# Patient Record
Sex: MALE | Race: BLACK OR AFRICAN AMERICAN | NOT HISPANIC OR LATINO | Employment: UNEMPLOYED | URBAN - METROPOLITAN AREA
[De-identification: names, ages, dates, MRNs, and addresses within clinical notes are randomized per-mention and may not be internally consistent; named-entity substitution may affect disease eponyms.]

---

## 2019-02-27 ENCOUNTER — HOSPITAL ENCOUNTER (EMERGENCY)
Facility: MEDICAL CENTER | Age: 39
End: 2019-02-27
Attending: EMERGENCY MEDICINE

## 2019-02-27 VITALS
OXYGEN SATURATION: 99 % | RESPIRATION RATE: 15 BRPM | BODY MASS INDEX: 24.05 KG/M2 | HEART RATE: 89 BPM | HEIGHT: 74 IN | SYSTOLIC BLOOD PRESSURE: 98 MMHG | WEIGHT: 187.39 LBS | DIASTOLIC BLOOD PRESSURE: 70 MMHG | TEMPERATURE: 98.2 F

## 2019-02-27 DIAGNOSIS — R41.82 ALTERED MENTAL STATUS, UNSPECIFIED ALTERED MENTAL STATUS TYPE: ICD-10-CM

## 2019-02-27 DIAGNOSIS — R46.89 AGGRESSIVE BEHAVIOR: ICD-10-CM

## 2019-02-27 DIAGNOSIS — F15.10 METHAMPHETAMINE ABUSE (HCC): ICD-10-CM

## 2019-02-27 LAB — POC BREATHALIZER: 0 PERCENT (ref 0–0.01)

## 2019-02-27 PROCEDURE — 302970 POC BREATHALIZER

## 2019-02-27 PROCEDURE — 304561 HCHG STAT O2

## 2019-02-27 PROCEDURE — 90791 PSYCH DIAGNOSTIC EVALUATION: CPT

## 2019-02-27 PROCEDURE — 99284 EMERGENCY DEPT VISIT MOD MDM: CPT

## 2019-02-27 ASSESSMENT — LIFESTYLE VARIABLES: DO YOU DRINK ALCOHOL: NO

## 2019-02-27 NOTE — ED NOTES
"Received orders for DC. 3 bags of belongings returned to pt and encouraged to get dressed. Stating he dose not want to leave. Security to bedside. Encouraged to go to [facility] however states he doesn't want to and \"I want to take a nap here.\"    VSS. PIV removed and dressing applied. Educated regarding establishing care with [facility]. Able to dress self and ambulates in room with steady gate.  "

## 2019-02-27 NOTE — DISCHARGE PLANNING
"[facility]   TRIAGE ASSESSMENT    Name: Miles Brush  MRN: [id number]  : 1980  Age: 38 y.o.  Date of assessment: 2019  PCP: Pcp Pt States None  Persons in attendance: Patient    CHIEF COMPLAINT/PRESENTING ISSUE (as stated by patient): 38 year old male BIB EMS, legal hold, inability to care for self, patient [place]assed from [place], when police arrived patient became aggressive, was endorsing SI. Was given 300mg IM Ketamine and 4mg Versed; pt cont aggressive in ED, placed in restraints; today, pt drowsy, \"can I sleep\"; oriented to self, place, events; calm behaviors; cooperative; audibly grinding his teeth  No delusions, paranoia, or internal preoccupation present; currently denies SI, HI, self-harm ideation; denies h/o SA; denies current outpt MH providers, primary care provider, or psych meds; denies h/o inpt MH/CD treatment; denies h/o aggression or arrests; current substance use includes methamphetamines (smoking $10 daily, last use 19), THC (2 joints/month, last use 2019); denies attending 12 step or recovery mtgs; states he would like help with CD resources, sobriety, but does not want a residential program; pt homeless, in [place] x 2 weeks from [place], [place] where he was living for a year; states he came to [place] b/c he thought he would find better resources but states he plans to return to  in March after he gets his disability payment the  of the month( $1300/month for back injury); states bus ticket is $145; also states meth is less expensive in [place] ($10) vs [place] ($17); states positive support system is \"God\" and the [facility]    Chief Complaint   Patient presents with   • Aggressive Behavior        CURRENT LIVING SITUATION/SOCIAL SUPPORT: homeless, in [place] x 2 weeks from [place], [place] where he was living for a year; plans to return to  in March after he gets his disability payment (#10417/month); states positive support system is \"God\" and the " [facility]      BEHAVIORAL HEALTH TREATMENT HISTORY  Does patient/parent report a history of prior behavioral health treatment for patient?   No:    SAFETY ASSESSMENT - SELF  Does patient acknowledge current or past symptoms of dangerousness to self? no  Does parent/significant other report patient has current or past symptoms of dangerousness to self? N\A  Does presenting problem suggest symptoms of dangerousness to self? Yes-pt verbalized SI this AM, no plan   Past Current    Suicidal Thoughts: []  [x]    Suicidal Plans: []  []    Suicidal Intent: []  []    Suicide Attempts: []  []    Self-Injury []  []      For any boxes checked above, provide detail: pt verbalized SI this AM, no plan    History of suicide by family member: no  History of suicide by friend/significant other: no  Recent change in frequency/specificity/intensity of suicidal thoughts or self-harm behavior? no  Current access to firearms, medications, or other identified means of suicide/self-harm? no  If yes, willing to restrict access to means of suicide/self-harm? yes - no means noted  Protective factors present:  Future-oriented, Hopefulness, Spiritual beliefs/practices and Willing to address in treatment    SAFETY ASSESSMENT - OTHERS  Does patient acknowledge current or past symptoms of aggressive behavior or risk to others?  No  Does parent/significant other report patient has current or past symptoms of aggressive behavior or risk to others?  N\A  Does presenting problem suggest symptoms of dangerousness to others? Yes:-became aggressive, was given 300mg IM Ketamine and 4mg Versed; pt cont aggressive in ED, placed in restraints   History Current   Thoughts of injuring others? []  [x]    Threats to injure others? []  []    Plan to injure others? []  []    Intent to injure others? []  [x]    Has injured others? []  []    Thoughts of killing others? []  []    Threats to kill others? []  []    Plans to kill others? []  []    Intent to kill others? []   []    Has killed others? []  []    Perpetrator of sexual assault? []  []    Family history of homicide? []  []      For any boxes checked above, please provide detail: pt became aggressive, was given 300mg IM Ketamine and 4mg Versed; pt cont aggressive in ED, placed in restraints    Recent change in frequency/specificity/intensity of thoughts or threats to harm others? yes - today aggressive when police called  Current access to firearms/other identified means of harm? no  If yes, willing to restrict access to weapons/means of harm? yes - no weapons or guns  Protective factors present: Fear of consequences, Moral/spiritual prohibition and Willing to address in treatment  Based on information provided during the current assessment, is a mandated “duty to warn” being exercised? No    Crisis Safety Plan completed and copy given to patient? No-pt refused    ABUSE/NEGLECT SCREENING  Does patient report feeling “unsafe” in his/her home, or afraid of anyone?  no  Does patient report any history of physical, sexual, or emotional abuse?  no  Does parent or significant other report any of the above? N\A  Is there evidence of neglect by self?  no  Is there evidence of neglect by a caregiver? no  Does the patient/parent report any history of CPS/APS/police involvement related to suspected abuse/neglect or domestic violence? no  Based on the information provided during the current assessment, is a mandated report of suspected abuse/neglect being made?  No    SUBSTANCE USE SCREENING  Yes:  José Miguel all substances used in the past 30 days:      Last Use Amount   []   Alcohol     [x]   Marijuana 1/2019 2 joints/month   []   Heroin     []   Prescription Opioids  (used without prescription, for    recreation, or in excess of prescribed amount)     []   Other Prescription  (used without prescription, for    recreation, or in excess of prescribed amount)     []   Cocaine      [x]   Methamphetamine 2/26/19 $10/day, daily use   []    "\"\" drugs (ectasy, MDMA)     []   Other substances        UDS results: pending collection  Breathalyzer results: negative     What consequences does the patient associate with any of the above substance use and or addictive behaviors? None    Risk factors for detox (check all that apply):  []  Seizures   []  Diaphoretic (sweating)   []  Tremors   []  Hallucinations   []  Increased blood pressure   []  Decreased blood pressure   []  Other   []  None      [] Patient education on risk factors for detoxification and instructed to return to ER as needed.      MENTAL STATUS   Participation: Limited verbal participation and drowsy  Grooming: Casual and Neat  Orientation: Drowsy/Somnolent and Disoriented to: date  Behavior: Calm  Eye contact: Limited  Mood: Irritable  Affect: Blunted  Thought process: Logical and Goal-directed  Thought content: Within normal limits  Speech: Rate within normal limits and Volume within normal limits  Perception: Within normal limits  Memory:  No gross evidence of memory deficits  Insight: Adequate  Judgment:  Adequate  Other:    Collateral information:   Source:  [] Significant other present in person:   [] Significant other by telephone  [] [facility]   [x] [facility] Nursing Staff  [x] [facility]  [] Other:     [] Unable to complete full assessment due to:  [] Acute intoxication  [] Patient declined to participate/engage  [] Patient verbally unresponsive  [] Significant cognitive deficits  [] Significant perceptual distortions or behavioral disorganization  [] Other:      CLINICAL IMPRESSIONS:  Primary:  Depressed mood secondary to methamphetamine use  Secondary:  methamphetamine use disorder      IDENTIFIED NEEDS/PLAN:  [Trigger DISPOSITION list for any items marked]    []  Imminent safety risk - self [] Imminent safety risk - others   []  Acute substance withdrawal []  Psychosis/Impaired reality testing   [x]  Mood/anxiety [x]  Substance use/Addictive behavior   [x] "  Maladaptive behaviro []  Parent/child conflict   []  Family/Couples conflict []  Biomedical   [x]  Housing []  Financial   []   Legal  Occupational/Educational   []  Domestic violence []  Other:     Disposition: Refer to [facility], 12 Step program: [facility] and [facility]    Does patient express agreement with the above plan? yes    Referral appointment(s) scheduled? no    Alert team only:   I have discussed findings and recommendations with Dr. Key who is in agreement with these recommendations and will DC the legal hold    Referral information sent to the following community providers :GABRIEL    If applicable : Referred  to :  for legal hold follow up at (time): GABRIEL Salinas R.N.  2/27/2019

## 2019-02-27 NOTE — ED PROVIDER NOTES
"ED Provider Note    Scribed for Dave Key M.D. by Naman Reza. 2/27/2019  4:25 AM    Primary care provider: Pcp Pt States None  Means of arrival: Ambulance  History obtained from: Nurse's note  History limited by: Patient's unresponsiveness     CHIEF COMPLAINT  Chief Complaint   Patient presents with   • Aggressive Behavior       HPI  Miles Brush is a 38 y.o. male who presents to the Emergency Department via ambulance for aggressive behavior   Per nurse's note the patient arrived with [employer] who reports the patient became aggressive and claimed SI. Patient was brought into the ED and given 300 mg IM ketamine and 4 mg versed. Upon ERP evaluation the patient does not answer questions and holds eyelids shut.    HPI limited secondary to patient's unresponsiveness.     REVIEW OF SYSTEMS  See HPI for further details.   ROS limited secondary to patient's unresponsiveness.     PAST MEDICAL HISTORY   has a past medical history of Bipolar 1 disorder (CMS-HCC) and Stroke (CMS-HCC).    SURGICAL HISTORY  patient denies any surgical history    SOCIAL HISTORY  Social History   Substance Use Topics   • Smoking status: Current Every Day Smoker   • Alcohol use Yes      History   Drug Use     Comment: ice       FAMILY HISTORY  None noted     CURRENT MEDICATIONS  No current facility-administered medications on file prior to encounter.      No current outpatient prescriptions on file prior to encounter.      ALLERGIES  No Known Allergies    PHYSICAL EXAM  VITAL SIGNS: /85   Pulse (!) 105   Temp 36.7 °C (98 °F) (Temporal)   Resp 16   Ht 1.88 m (6' 2\")   Wt 85 kg (187 lb 6.3 oz)   SpO2 97%   BMI 24.06 kg/m²     Nursing note and vitals reviewed.  Constitutional: Well-developed and well-nourished. No distress.   HENT: Head is normocephalic and atraumatic. Oropharynx is clear and moist without exudate or erythema.   Eyes: Pupils are equal, round, and reactive to light. Conjunctiva are normal.   Cardiovascular: Normal " rate and regular rhythm. No murmur heard. Normal radial pulses.  Pulmonary/Chest: Breath sounds normal. No wheezes or rales.   Abdominal: Soft. No distention    Musculoskeletal: Extremities exhibit normal range of motion without edema or tenderness.   Neurological: Unresponsive. No focal deficits noted.  Skin: Skin is warm and dry. No rash.   Psychiatric: Unable to assess.      DIAGNOSTIC STUDIES / PROCEDURES    LABS  Results for orders placed or performed during the hospital encounter of 02/27/19   POC BREATHALIZER   Result Value Ref Range    POC Breathalizer 0.002 0.00 - 0.01 Percent      All labs reviewed by me.    COURSE & MEDICAL DECISION MAKING  Nursing notes, VS, PMSFHx reviewed in chart.     Review of past medical records shows the patient was last here in 2016 for SI.      4:25 AM - Patient seen and examined at bedside. Patient is nonresponsive, however holds eyes closed.     7:34 AM On repeat evaluation, the patient is alert and awake and smiling. He denies SI or HI.     8:52 AM Reviewed the patient's UMBERTO at 0.002.     10:38 AM I discussed the patient's case and the above findings with [facility] who states the patient has been using methamphetamines. Patient is not suicidal and continues to rest comfortably in room. [facility] is comfortable with patient discharge.     HTN/IDDM FOLLOW UP:  The patient is referred to a primary physician for blood pressure management, diabetic screening, and for all other preventive health concerns    The patient will return for new or worsening symptoms and is stable at the time of discharge.    The patient is referred to a primary physician for blood pressure management, diabetic screening, and for all other preventative health concerns.    DISPOSITION:  Patient will be discharged home in stable condition.    FOLLOW UP:  [facility], Emergency Dept  [address] [place] [address] [id number] [phone]  Schedule an appointment as soon as possible for  a visit         FINAL IMPRESSION  1. Altered mental status, unspecified altered mental status type    2. Aggressive behavior    3. Methamphetamine abuse (HCC)          INaman (Scribe), am scribing for, and in the presence of, Dave Key M.D..    Electronically signed by: Naman Reza ([place]), 2/27/2019    IDave M.D. personally performed the services described in this documentation, as scribed by Naman Reza in my presence, and it is both accurate and complete. E.     The note accurately reflects work and decisions made by me.  Dave Key  2/27/2019  11:50 AM

## 2019-02-27 NOTE — ED TRIAGE NOTES
Patient arrives via EMS and police from [facility]. Per report, patient was trespassed from [facility], when police arrived patient became aggressive, was endorsing SI. Was given 300mg IM Ketamine and 4mg Versed. Arrives somnolent, remains on 3L NC with SpO2 98%. Opens eyes to tactile stimuli. Changed into hospital gown, belongings placed in bags.

## 2019-02-27 NOTE — ED NOTES
Rounded on pt. Suicide screening completed. States he wishes to be dead and has suicidal ideation, however has no hx of attempt and no plan.

## 2019-02-27 NOTE — ED NOTES
Received report and assumed care of pt. 1:1 sitter placed outside of room. Per report [place] Scale has not been completed prior do to pt not being cooperative. Security at bedside to search 3 bags of belongings. Face sheets printed out for each bag.

## 2019-02-27 NOTE — ED NOTES
Pt arrived on legal hold initiated by connie. Unable to obtain [place] suicide scale when pt arrived d/t pt unable to answer questions from noc.  Will continue to monitor. Pt maintaining 100% on ra. Pt calm at RN. erp at bedside for re-evaluation and pt clear to go to [facility].

## 2019-02-27 NOTE — ED NOTES
"Received report from Eva ALEXIS assumed care done.   Pt now awake but still somnolent started screaming \"HELLO\" for few seconds then fell back to sleep.  Will continue to monitor.  "

## 2019-02-27 NOTE — ED NOTES
Pt moved to G29. Bedside report to Susannah. Security called for belongings search. Soft wrist restraints removed. Pt now calm at staff.  Denies suicidal ideation at RN. Pt more awake, respiration unlabored.

## 2020-05-22 ENCOUNTER — HOSPITAL ENCOUNTER (EMERGENCY)
Facility: MEDICAL CENTER | Age: 40
End: 2020-05-23
Attending: EMERGENCY MEDICINE

## 2020-05-22 DIAGNOSIS — T14.8XXA ABRASION: ICD-10-CM

## 2020-05-22 DIAGNOSIS — F15.10 METHAMPHETAMINE ABUSE (HCC): ICD-10-CM

## 2020-05-22 LAB
ALBUMIN SERPL BCP-MCNC: 4.3 G/DL (ref 3.2–4.9)
ALBUMIN/GLOB SERPL: 1.2 G/DL
ALP SERPL-CCNC: 75 U/L (ref 30–99)
ALT SERPL-CCNC: 44 U/L (ref 2–50)
ANION GAP SERPL CALC-SCNC: 12 MMOL/L (ref 7–16)
AST SERPL-CCNC: 61 U/L (ref 12–45)
BASOPHILS # BLD AUTO: 0.6 % (ref 0–1.8)
BASOPHILS # BLD: 0.04 K/UL (ref 0–0.12)
BILIRUB SERPL-MCNC: 2.5 MG/DL (ref 0.1–1.5)
BUN SERPL-MCNC: 13 MG/DL (ref 8–22)
CALCIUM SERPL-MCNC: 8.9 MG/DL (ref 8.5–10.5)
CHLORIDE SERPL-SCNC: 106 MMOL/L (ref 96–112)
CO2 SERPL-SCNC: 19 MMOL/L (ref 20–33)
CREAT SERPL-MCNC: 1.19 MG/DL (ref 0.5–1.4)
EKG IMPRESSION: NORMAL
EOSINOPHIL # BLD AUTO: 0.18 K/UL (ref 0–0.51)
EOSINOPHIL NFR BLD: 2.8 % (ref 0–6.9)
ERYTHROCYTE [DISTWIDTH] IN BLOOD BY AUTOMATED COUNT: 43.5 FL (ref 35.9–50)
GLOBULIN SER CALC-MCNC: 3.7 G/DL (ref 1.9–3.5)
GLUCOSE SERPL-MCNC: 209 MG/DL (ref 65–99)
HCT VFR BLD AUTO: 41.4 % (ref 42–52)
HGB BLD-MCNC: 14.1 G/DL (ref 14–18)
IMM GRANULOCYTES # BLD AUTO: 0.03 K/UL (ref 0–0.11)
IMM GRANULOCYTES NFR BLD AUTO: 0.5 % (ref 0–0.9)
LYMPHOCYTES # BLD AUTO: 1.93 K/UL (ref 1–4.8)
LYMPHOCYTES NFR BLD: 30.1 % (ref 22–41)
MCH RBC QN AUTO: 33.6 PG (ref 27–33)
MCHC RBC AUTO-ENTMCNC: 34.1 G/DL (ref 33.7–35.3)
MCV RBC AUTO: 98.6 FL (ref 81.4–97.8)
MONOCYTES # BLD AUTO: 0.89 K/UL (ref 0–0.85)
MONOCYTES NFR BLD AUTO: 13.9 % (ref 0–13.4)
NEUTROPHILS # BLD AUTO: 3.35 K/UL (ref 1.82–7.42)
NEUTROPHILS NFR BLD: 52.1 % (ref 44–72)
NRBC # BLD AUTO: 0 K/UL
NRBC BLD-RTO: 0 /100 WBC
PLATELET # BLD AUTO: 244 K/UL (ref 164–446)
PMV BLD AUTO: 8.8 FL (ref 9–12.9)
POTASSIUM SERPL-SCNC: 3.3 MMOL/L (ref 3.6–5.5)
PROT SERPL-MCNC: 8 G/DL (ref 6–8.2)
RBC # BLD AUTO: 4.2 M/UL (ref 4.7–6.1)
SODIUM SERPL-SCNC: 137 MMOL/L (ref 135–145)
TROPONIN T SERPL-MCNC: 7 NG/L (ref 6–19)
WBC # BLD AUTO: 6.4 K/UL (ref 4.8–10.8)

## 2020-05-22 PROCEDURE — 99284 EMERGENCY DEPT VISIT MOD MDM: CPT

## 2020-05-22 PROCEDURE — 700105 HCHG RX REV CODE 258: Performed by: EMERGENCY MEDICINE

## 2020-05-22 PROCEDURE — 96374 THER/PROPH/DIAG INJ IV PUSH: CPT

## 2020-05-22 PROCEDURE — 93005 ELECTROCARDIOGRAM TRACING: CPT | Performed by: EMERGENCY MEDICINE

## 2020-05-22 PROCEDURE — 84484 ASSAY OF TROPONIN QUANT: CPT

## 2020-05-22 PROCEDURE — 80053 COMPREHEN METABOLIC PANEL: CPT

## 2020-05-22 PROCEDURE — 85025 COMPLETE CBC W/AUTO DIFF WBC: CPT

## 2020-05-22 PROCEDURE — 96375 TX/PRO/DX INJ NEW DRUG ADDON: CPT

## 2020-05-22 PROCEDURE — 700111 HCHG RX REV CODE 636 W/ 250 OVERRIDE (IP): Performed by: EMERGENCY MEDICINE

## 2020-05-22 RX ORDER — KETOROLAC TROMETHAMINE 30 MG/ML
30 INJECTION, SOLUTION INTRAMUSCULAR; INTRAVENOUS ONCE
Status: COMPLETED | OUTPATIENT
Start: 2020-05-22 | End: 2020-05-22

## 2020-05-22 RX ORDER — SODIUM CHLORIDE 9 MG/ML
1000 INJECTION, SOLUTION INTRAVENOUS ONCE
Status: COMPLETED | OUTPATIENT
Start: 2020-05-22 | End: 2020-05-22

## 2020-05-22 RX ORDER — LORAZEPAM 2 MG/ML
1 INJECTION INTRAMUSCULAR ONCE
Status: COMPLETED | OUTPATIENT
Start: 2020-05-22 | End: 2020-05-22

## 2020-05-22 RX ADMIN — SODIUM CHLORIDE 1000 ML: 9 INJECTION, SOLUTION INTRAVENOUS at 18:27

## 2020-05-22 RX ADMIN — LORAZEPAM 1 MG: 2 INJECTION INTRAMUSCULAR; INTRAVENOUS at 18:31

## 2020-05-22 RX ADMIN — KETOROLAC TROMETHAMINE 30 MG: 30 INJECTION, SOLUTION INTRAMUSCULAR at 18:31

## 2020-05-23 VITALS
BODY MASS INDEX: 30.22 KG/M2 | RESPIRATION RATE: 16 BRPM | HEIGHT: 71 IN | TEMPERATURE: 97.9 F | HEART RATE: 87 BPM | DIASTOLIC BLOOD PRESSURE: 79 MMHG | OXYGEN SATURATION: 97 % | SYSTOLIC BLOOD PRESSURE: 131 MMHG | WEIGHT: 215.83 LBS

## 2020-05-23 NOTE — ED TRIAGE NOTES
Patient was doing drugs and drinking yesterday when he passed out with shorts on yesterday face down and burned the back of his legs with the sun.  He is not a very good historian.  Asking for morphine for his sunburn in triage.  Explained a doctor would need to evaluate him.  Asked patient to show burn in triage, no major redness, no swelling noted.  Patient asking me to keep looking.  Explained doctor would evaluate.

## 2020-05-23 NOTE — ED PROVIDER NOTES
CHIEF COMPLAINT  Chief Complaint   Patient presents with   • Wound Check       HPI  Miles Brush is a 39 y.o. male who presents for what seems to be an abrasion to 1 of his thighs posteriorly.  He states initially to triage that he burned his legs after he was doing drugs and drinking yesterday and passed out in the sun.  He is a poor historian.  He has a history of bipolar and methamphetamine abuse.  He was asking for morphine in triage.  When I asked him about the lesions he states he is not really sure how they got there but maybe his shorts scraped his legs.  He denies fever.  No vomiting.  No shortness of breath or chest pain.    REVIEW OF SYSTEMS  All other systems are negative.     PAST MEDICAL HISTORY  Past Medical History:   Diagnosis Date   • Bipolar 1 disorder (HCC)    • Stroke (HCC)        FAMILY HISTORY  No family history on file.    SOCIAL HISTORY  Social History     Socioeconomic History   • Marital status: Single     Spouse name: Not on file   • Number of children: Not on file   • Years of education: Not on file   • Highest education level: Not on file   Occupational History   • Not on file   Social Needs   • Financial resource strain: Not on file   • Food insecurity     Worry: Not on file     Inability: Not on file   • Transportation needs     Medical: Not on file     Non-medical: Not on file   Tobacco Use   • Smoking status: Current Every Day Smoker   Substance and Sexual Activity   • Alcohol use: Yes   • Drug use: Yes     Comment: ice   • Sexual activity: Not on file   Lifestyle   • Physical activity     Days per week: Not on file     Minutes per session: Not on file   • Stress: Not on file   Relationships   • Social connections     Talks on phone: Not on file     Gets together: Not on file     Attends Cheondoism service: Not on file     Active member of club or organization: Not on file     Attends meetings of clubs or organizations: Not on file     Relationship status: Not on file   • Intimate  "partner violence     Fear of current or ex partner: Not on file     Emotionally abused: Not on file     Physically abused: Not on file     Forced sexual activity: Not on file   Other Topics Concern   • Not on file   Social History Narrative   • Not on file       SURGICAL HISTORY  History reviewed. No pertinent surgical history.    CURRENT MEDICATIONS  Home Medications     Reviewed by Pia Casey R.N. (Registered Nurse) on 05/22/20 at 1755  Med List Status: Not Addressed   Medication Last Dose Status        Patient Carlos Taking any Medications                       ALLERGIES  No Known Allergies    PHYSICAL EXAM  VITAL SIGNS: /86   Pulse (!) 139   Temp 36.6 °C (97.8 °F) (Temporal)   Resp 18   Ht 1.803 m (5' 11\")   Wt 97.9 kg (215 lb 13.3 oz)   SpO2 98%   BMI 30.10 kg/m²      Constitutional: Well developed, Well nourished, moderate distress, Non-toxic appearance.   HENT: Normocephalic, Atraumatic, TMs normal, mucous membranes moist, no erythema, exudates, swelling, or masses, nares patent  Eyes: nonicteric  Neck: Supple, no meningismus  Lymphatic: No lymphadenopathy noted.   Cardiovascular: Tachycardic with regular rhythm, no gallops rubs or murmurs  Lungs: Clear bilaterally   Abdomen: Bowel sounds normal, Soft, No tenderness, No pulsatile masses.   Skin: Warm, Dry, no rash  Back: No tenderness, No CVA tenderness.   Genitalia: Deferred  Rectal: Deferred  Extremities: There appears to be a minor abrasion to the inner left thigh region with no surrounding erythema or discharge  Neurologic: Alert, the patient is moving around in his bed and is grinding his teeth.  He is moving all extremities equally.  Speech is not slurred but it is pressured.  Psychiatric: Affect normal    EKG  Time is 642, rate of 109, sinus tachycardia, early precordial transition, prolonged QTC, no ST segment elevation or depression, no T wave changes    RADIOLOGY/PROCEDURES  Results for orders placed or performed during the hospital " encounter of 05/22/20   CBC WITH DIFFERENTIAL   Result Value Ref Range    WBC 6.4 4.8 - 10.8 K/uL    RBC 4.20 (L) 4.70 - 6.10 M/uL    Hemoglobin 14.1 14.0 - 18.0 g/dL    Hematocrit 41.4 (L) 42.0 - 52.0 %    MCV 98.6 (H) 81.4 - 97.8 fL    MCH 33.6 (H) 27.0 - 33.0 pg    MCHC 34.1 33.7 - 35.3 g/dL    RDW 43.5 35.9 - 50.0 fL    Platelet Count 244 164 - 446 K/uL    MPV 8.8 (L) 9.0 - 12.9 fL    Neutrophils-Polys 52.10 44.00 - 72.00 %    Lymphocytes 30.10 22.00 - 41.00 %    Monocytes 13.90 (H) 0.00 - 13.40 %    Eosinophils 2.80 0.00 - 6.90 %    Basophils 0.60 0.00 - 1.80 %    Immature Granulocytes 0.50 0.00 - 0.90 %    Nucleated RBC 0.00 /100 WBC    Neutrophils (Absolute) 3.35 1.82 - 7.42 K/uL    Lymphs (Absolute) 1.93 1.00 - 4.80 K/uL    Monos (Absolute) 0.89 (H) 0.00 - 0.85 K/uL    Eos (Absolute) 0.18 0.00 - 0.51 K/uL    Baso (Absolute) 0.04 0.00 - 0.12 K/uL    Immature Granulocytes (abs) 0.03 0.00 - 0.11 K/uL    NRBC (Absolute) 0.00 K/uL   COMP METABOLIC PANEL   Result Value Ref Range    Sodium 137 135 - 145 mmol/L    Potassium 3.3 (L) 3.6 - 5.5 mmol/L    Chloride 106 96 - 112 mmol/L    Co2 19 (L) 20 - 33 mmol/L    Anion Gap 12.0 7.0 - 16.0    Glucose 209 (H) 65 - 99 mg/dL    Bun 13 8 - 22 mg/dL    Creatinine 1.19 0.50 - 1.40 mg/dL    Calcium 8.9 8.5 - 10.5 mg/dL    AST(SGOT) 61 (H) 12 - 45 U/L    ALT(SGPT) 44 2 - 50 U/L    Alkaline Phosphatase 75 30 - 99 U/L    Total Bilirubin 2.5 (H) 0.1 - 1.5 mg/dL    Albumin 4.3 3.2 - 4.9 g/dL    Total Protein 8.0 6.0 - 8.2 g/dL    Globulin 3.7 (H) 1.9 - 3.5 g/dL    A-G Ratio 1.2 g/dL   TROPONIN   Result Value Ref Range    Troponin T 7 6 - 19 ng/L   ESTIMATED GFR   Result Value Ref Range    GFR If African American >60 >60 mL/min/1.73 m 2    GFR If Non African American >60 >60 mL/min/1.73 m 2   EKG (NOW)   Result Value Ref Range    Report       [facility] Emergency Dept.    Test Date:  2020-05-22  Pt Name:    JANE CHANG                 Department: ER  MRN:         [id number]                      Room:       [facility]  Gender:     Male                         Technician: [id number]  :        1980                   Requested By:BERTO SUTTON  Order #:    [id number]                    Reading MD:    Measurements  Intervals                                Axis  Rate:       109                          P:          69  AZ:         124                          QRS:        42  QRSD:       78                           T:          67  QT:         360  QTc:        485    Interpretive Statements  SINUS TACHYCARDIA  PROBABLE LEFT ATRIAL ABNORMALITY  EARLY PRECORDIAL R/S TRANSITION  BORDERLINE PROLONGED QT INTERVAL  Compared to ECG 2015 07:27:47  Incomplete right bundle-branch block no longer present  Myocardial infarct finding no longer present  ST (T wave) deviation no longer present       COURSE & MEDICAL DECISION MAKING  Pertinent Labs & Imaging studies reviewed. (See chart for details)  This is a 39-year-old who has a history of bipolar as well as methamphetamine and alcohol abuse.  He presents complaining of what appears to be a minor abrasion to his proximal thigh on the left.  There is no evidence of sunburn.  The patient was tachycardic up to the 140 range on arrival.  He was given fluids and Ativan and his heart rate is come down to around 109.  I suspect his heart rate elevation is likely methamphetamine induced.  He is restless and grinding his teeth on arrival.  Otherwise, I do not see any evidence of an infectious process.  His white count is normal and he is afebrile.  He will be treated supportively for what appears to be an abrasion.    FINAL IMPRESSION  1.  Abrasion  2.  Methamphetamine abuse  3.         Electronically signed by: Berto Sutton M.D., 2020 6:21 PM

## 2020-05-29 ENCOUNTER — HOSPITAL ENCOUNTER (EMERGENCY)
Facility: MEDICAL CENTER | Age: 40
End: 2020-05-29
Attending: EMERGENCY MEDICINE

## 2020-05-29 VITALS
RESPIRATION RATE: 16 BRPM | OXYGEN SATURATION: 97 % | WEIGHT: 217.15 LBS | BODY MASS INDEX: 30.4 KG/M2 | DIASTOLIC BLOOD PRESSURE: 73 MMHG | TEMPERATURE: 98.8 F | HEART RATE: 97 BPM | HEIGHT: 71 IN | SYSTOLIC BLOOD PRESSURE: 112 MMHG

## 2020-05-29 DIAGNOSIS — R45.851 SUICIDAL IDEATION: ICD-10-CM

## 2020-05-29 LAB — POC BREATHALIZER: 0 PERCENT (ref 0–0.01)

## 2020-05-29 PROCEDURE — A9270 NON-COVERED ITEM OR SERVICE: HCPCS | Performed by: EMERGENCY MEDICINE

## 2020-05-29 PROCEDURE — 99285 EMERGENCY DEPT VISIT HI MDM: CPT

## 2020-05-29 PROCEDURE — 90791 PSYCH DIAGNOSTIC EVALUATION: CPT

## 2020-05-29 PROCEDURE — 93005 ELECTROCARDIOGRAM TRACING: CPT | Performed by: EMERGENCY MEDICINE

## 2020-05-29 PROCEDURE — 700102 HCHG RX REV CODE 250 W/ 637 OVERRIDE(OP): Performed by: EMERGENCY MEDICINE

## 2020-05-29 PROCEDURE — 302970 POC BREATHALIZER: Performed by: EMERGENCY MEDICINE

## 2020-05-29 RX ORDER — LORAZEPAM 1 MG/1
1 TABLET ORAL ONCE
Status: COMPLETED | OUTPATIENT
Start: 2020-05-29 | End: 2020-05-29

## 2020-05-29 RX ORDER — HALOPERIDOL 5 MG/1
5 TABLET ORAL ONCE
Status: DISCONTINUED | OUTPATIENT
Start: 2020-05-29 | End: 2020-05-29

## 2020-05-29 RX ADMIN — LORAZEPAM 1 MG: 1 TABLET ORAL at 18:51

## 2020-05-29 ASSESSMENT — FIBROSIS 4 INDEX: FIB4 SCORE: 1.55

## 2020-05-30 LAB — EKG IMPRESSION: NORMAL

## 2020-05-30 NOTE — ED NOTES
RN when into discharge pt asked if pt needed anything else before he left-- pt swung and threw D/C papers at RN. Security was notified and now at bedside

## 2020-05-30 NOTE — ED TRIAGE NOTES
Patient comes in stating he is suicidal and having thoughts of hurting himself.  He states he has no specific plan.  Patient is currently homeless, he is not on any medications.  Poor historian.

## 2020-05-30 NOTE — CONSULTS
"[facility]   TRIAGE ASSESSMENT    Name: Miles Brush  MRN: [id number]  : 1980  Age: 39 y.o.  Date of assessment: 2020  PCP: Pcp Pt States None  Persons in attendance: Patient    CHIEF COMPLAINT/PRESENTING ISSUE (as stated by patient): 39 year old male BIB self today, legal hold, ,SI, no plan or intent; current substance use includes Mehtmaphetamines weekly with last use 2020, ETOH refuses to answer how much or last use; pt with constant teeth grinding; alert, oriented to place, disoriented to date; refusing to answer evaluation questions denies current medical needs;  Identifies current mental health complaint as \"hearing voices\" intermittent; no current SI, HI, or self-harm ideation noted; asking \"can I stay here\"; denies current outpt MH providers or psych meds; denies h/o inpt MH/CD treatment; with noted h/o aggression 19, \"patient was trespassed from [facility], when police arrived patient became aggressive,.Was given 300mg IM Ketamine and 4mg Versed; pt aggressive in ED, placed in restraints\"; today pt asked writer RN \"where is security\" and refused to answer why he asked this question; states he is homeless; per previous [facility] ED visit 2019, pt receives SSDI $1300/month for a back injury  Pt received Ativan 1 mg PO today at 1851        Chief Complaint   Patient presents with   • Suicidal        CURRENT LIVING SITUATION/SOCIAL SUPPORT: homeless    BEHAVIORAL HEALTH TREATMENT HISTORY  Does patient/parent report a history of prior behavioral health treatment for patient?   No:    SAFETY ASSESSMENT - SELF  Does patient acknowledge current or past symptoms of dangerousness to self? Yes-verbalized vague SI, no plan, today  Does parent/significant other report patient has current or past symptoms of dangerousness to self? N\A  Does presenting problem suggest symptoms of dangerousness to self? Yes:     Past Current    Suicidal Thoughts: [x]  []    Suicidal Plans: []  []    Suicidal " Intent: []  []    Suicide Attempts: []  []    Self-Injury []  []      For any boxes checked above, provide detail: verbalized vague SI, no plan, today    History of suicide by family member:  History of suicide by friend/significant other: no  Recent change in frequency/specificity/intensity of suicidal thoughts or self-harm behavior? yes - today  Current access to firearms, medications, or other identified means of suicide/self-harm? no  If yes, willing to restrict access to means of suicide/self-harm? yes - no guns or weapons  Protective factors present:  Future-oriented, Positive self-efficacy and Spiritual beliefs/practices    SAFETY ASSESSMENT - OTHERS  Does patient acknowledge current or past symptoms of aggressive behavior or risk to others? Yes-2/29/2019 towards [facility] ED staff  Does parent/significant other report patient has current or past symptoms of aggressive behavior or risk to others?  N\A  Does presenting problem suggest symptoms of dangerousness to others? No    Crisis Safety Plan completed and copy given to patient? No-pt refused    ABUSE/NEGLECT SCREENING  Does patient report feeling “unsafe” in his/her home, or afraid of anyone? Unknown, pt refused to answer  Does patient report any history of physical, sexual, or emotional abuse?  Unknown, pt refused to answer  Does parent or significant other report any of the above? N\A  Is there evidence of neglect by self?  yes  Is there evidence of neglect by a caregiver? no  Does the patient/parent report any history of CPS/APS/police involvement related to suspected abuse/neglect or domestic violence?  Unknown, pt refused to answer  Based on the information provided during the current assessment, is a mandated report of suspected abuse/neglect being made?  No    SUBSTANCE USE SCREENING  Yes:  José Miguel all substances used in the past 30 days:      Last Use Amount   [x]   Alcohol Refused to anwer    []   Marijuana     []   Heroin     []   Prescription Opioids   "(used without prescription, for    recreation, or in excess of prescribed amount)     []   Other Prescription  (used without prescription, for    recreation, or in excess of prescribed amount)     []   Cocaine      [x]   Methamphetamine 5/27/2020    []   \"\" drugs (ectasy, MDMA)     []   Other substances        UDS results: not collectted  Breathalyzer results: negative    What consequences does the patient associate with any of the above substance use and or addictive behaviors? None    Risk factors for detox (check all that apply):  []  Seizures   [x]  Diaphoretic (sweating)   []  Tremors   [x]  Hallucinations   [x]  Increased blood pressure   [x]  Decreased blood pressure   []  Other   []  None      [] Patient education on risk factors for detoxification and instructed to return to ER as needed.      MENTAL STATUS   Participation: Limited verbal participation, Guarded, Defensive and Resistant  Grooming: Casual and Disheveled  Orientation: Alert and Disoriented to: date  Behavior: anxious, tense  Eye contact: Limited  Mood: Anxious and Angry  Affect: Constricted, Blunted, Anxious and Angry  Thought process: Logical, Goal-directed and Circumstantial  Thought content: Preoccupation  Speech: Rate within normal limits and Volume within normal limits  Perception: Within normal limits  Memory:  Poor memory for chronology of events  Insight: Adequate  Judgment:  Adequate  Other:    Collateral information:   Source:  [] Significant other present in person:   [] Significant other by telephone  [] [facility]   [x] [facility]  [x] [facility]  [] Other:     [] Unable to complete full assessment due to:  [] Acute intoxication  [] Patient declined to participate/engage  [] Patient verbally unresponsive  [] Significant cognitive deficits  [] Significant perceptual distortions or behavioral disorganization  [] Other:      CLINICAL IMPRESSIONS:  Primary: Amphetamine use d/o   Secondary:  " Antisocial personality d/o       IDENTIFIED NEEDS/PLAN:  [Trigger DISPOSITION list for any items marked]    []  Imminent safety risk - self [] Imminent safety risk - others   []  Acute substance withdrawal []  Psychosis/Impaired reality testing   []  Mood/anxiety [x]  Substance use/Addictive behavior   []  Maladaptive behaviro []  Parent/child conflict   []  Family/Couples conflict []  Biomedical   [x]  Housing [x]  Financial   []   Legal  Occupational/Educational   []  Domestic violence []  Other:     Disposition: [facility] no active insurance plan; writer RN reiviewed community CD and MH resources with pt, with written information given; despite increased verbal encouragement given, pt refused voluntary admission to [facility]; pt to DC to self tonight    Does patient express agreement with the above plan? yes    Referral appointment(s) scheduled? no    Alert team only:   I have discussed findings and recommendations with Dr. José who is in agreement with these recommendations. Will DC the legal hold    Referral information sent to the following community providers : NA    If applicable : Referred  to  NA    :Yenni Salinas R.N.  5/29/2020

## 2020-05-30 NOTE — ED PROVIDER NOTES
"ED Provider Note    Scribed for Corby José M.D. by Constanza Shah. 5/29/2020  6:27 PM    Primary care provider: Pcp Pt States None  Means of arrival: Walk-in  History obtained from: Patient  History limited by: None    CHIEF COMPLAINT  Chief Complaint   Patient presents with   • Suicidal       HPI  Miles Brush is a 39 y.o. male with a history of Bipolar 1 disorder who presents to the Emergency Department for evaluation of suicidal ideation onset two days ago. The patient denies any specific plan. Patient has a history of methamphetamine abuse and most recently used two days ago. He reports auditory hallucinations, but no change from baseline. He denies any alcohol use today, however, reports history of alcohol abuse. Patient notes homelessness for the past year. No other acute complaints reported.     REVIEW OF SYSTEMS  Pertinent positives include: suicidal ideation and auditory hallucinations. Pertinent negatives include: SI with plan. See history of present illness. All other systems are negative.     PAST MEDICAL HISTORY   has a past medical history of Bipolar 1 disorder (HCC) and Stroke (HCC).    SURGICAL HISTORY  patient denies any surgical history    SOCIAL HISTORY  Social History     Tobacco Use   • Smoking status: Current Every Day Smoker   Substance Use Topics   • Alcohol use: Yes   • Drug use: Yes     Comment: ice      Social History     Substance and Sexual Activity   Drug Use Yes    Comment: ice       FAMILY HISTORY  No family history noted.     CURRENT MEDICATIONS  No current facility-administered medications on file prior to encounter.      No current outpatient medications on file prior to encounter.       ALLERGIES  No Known Allergies    PHYSICAL EXAM  VITAL SIGNS: /86   Pulse (!) 109   Temp 36.8 °C (98.2 °F) (Oral)   Resp 17   Ht 1.803 m (5' 11\")   Wt 98.5 kg (217 lb 2.5 oz)   SpO2 96%   BMI 30.29 kg/m²     Constitutional: Alert in no apparent distress.  HENT: No signs of " trauma, Bilateral external ears normal, Nose normal. Uvula midline.   Eyes: Pupils are equal and reactive, Conjunctiva normal, Non-icteric.   Neck: Normal range of motion, No tenderness, Supple, No stridor.   Lymphatic: No lymphadenopathy noted.   Cardiovascular: Tachycardic rate. Regular rhythm, no murmurs.   Thorax & Lungs: Normal breath sounds, No respiratory distress, No wheezing, No chest tenderness.   Abdomen:  Soft, No tenderness, No peritoneal signs, No masses, No pulsatile masses.   Skin: Warm, Dry, No erythema, No rash.   Back: No bony tenderness, No CVA tenderness.   Extremities: Intact distal pulses, No edema, No tenderness, No cyanosis.  Musculoskeletal: Good range of motion in all major joints. No tenderness to palpation or major deformities noted.   Neurologic: Alert , Normal motor function, Normal sensory function, No focal deficits noted.   Psychiatric: Affect normal, Judgment normal, Mood normal.     DIAGNOSTIC STUDIES / PROCEDURES    LABS  Labs Reviewed   POC BREATHALIZER - Normal   URINE DRUG SCREEN   CBC WITH DIFFERENTIAL   COMP METABOLIC PANEL   ACETAMINOPHEN   SALICYLATE      All labs reviewed by me.    EKG  12 Lead EKG interpreted by me to show:  Indication: Methamphetamine use and tachycardia  Normal sinus rhythm  Rate 87  Axis: Normal  Intervals: Normal  Normal T waves  Normal ST segments  My impression of this EKG: No STEMI. Unremarkable EKG.     COURSE & MEDICAL DECISION MAKING  Nursing notes, VS, PMSFHx reviewed in chart.    39 y.o. male p/w chief complaint of suicidal ideation.    6:27 PM Patient seen and examined at bedside. Ordered for CBC with differential, CMP, acetaminophen level, salicylate level, urine drug screen, POC breathalizer, and EKG to evaluate. Patient will be treated with Ativan tablet 1 mg.     I verified that the patient was wearing a mask and I was wearing appropriate PPE every time I entered the room. The patient's mask was on the patient at all times during my  encounter except for a brief view of the oropharynx.    The differential diagnoses include but are not limited to:   No ongoing SI at this time  No homicidal ideation.    No obvious medial cause of patient's complaints, appropriate for psychiatric evaluation at this time.    The patient will return for new or worsening symptoms and is stable at the time of discharge.  Multiple providers with behavioral health assessed patient at bedside  Patient denies ongoing suicidal ideation  Patient provided with resources, encouraged meth cessation    Offered to help transfer patient to  The patient is referred to a primary physician for blood pressure management, diabetic screening, and for all other preventative health concerns.      DISPOSITION:  Patient will be discharged home in stable condition.    FOLLOW UP:  No follow-up provider specified.    OUTPATIENT MEDICATIONS:  There are no discharge medications for this patient.      FINAL IMPRESSION  1. Suicidal ideation          Constanza WILKINS (Cortney), am scribing for, and in the presence of, Corby José M.D..    Electronically signed by: Constanza Shah (Chrisibe), 5/29/2020    Corby WILKINS M.D. personally performed the services described in this documentation, as scribed by Constanza Shah in my presence, and it is both accurate and complete. C.     The note accurately reflects work and decisions made by me.  Corby José M.D.  5/30/2020  2:04 AM

## 2020-05-30 NOTE — DISCHARGE PLANNING
"Alert Team    Consulted with day shift Alert Team and ERP; patient noted as discharging shortly, this writer attempted to offer the patient resources for follow-up services in the community. Patient averting gaze from writer, refusing to talk initially. Later the patient remarked \"I just want a place to stay man, I don't want any of this bullshit\". Patient closed his eyes and put blanket over his head and refused to communicate further; notified bedside RN of patient status; patient to discharge to home shortly. Security to be notified if patient refuses to depart. Alert Team to monitor; nothing further to note at this time.   "

## 2020-05-30 NOTE — ED NOTES
Patient states that he relapsed and used Meth this week and had been clean for some time. States the voices in his head have been more severe since using Meth. Patient appears anxious, loudly grinding his teeth.

## 2020-11-20 ENCOUNTER — HOSPITAL ENCOUNTER (EMERGENCY)
Dept: HOSPITAL 8 - ED | Age: 40
Discharge: HOME | End: 2020-11-20
Payer: MEDICARE

## 2020-11-20 VITALS — SYSTOLIC BLOOD PRESSURE: 112 MMHG | DIASTOLIC BLOOD PRESSURE: 76 MMHG

## 2020-11-20 VITALS — BODY MASS INDEX: 29.63 KG/M2 | WEIGHT: 211.64 LBS | HEIGHT: 71 IN
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
## 2020-11-20 DIAGNOSIS — S33.5XXA: ICD-10-CM

## 2020-11-20 DIAGNOSIS — S39.012A: Primary | ICD-10-CM
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
## 2020-11-20 DIAGNOSIS — M54.16: ICD-10-CM
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
## 2020-11-20 PROCEDURE — 72110 X-RAY EXAM L-2 SPINE 4/>VWS: CPT

## 2020-11-20 PROCEDURE — 96374 THER/PROPH/DIAG INJ IV PUSH: CPT

## 2020-11-20 PROCEDURE — 99284 EMERGENCY DEPT VISIT MOD MDM: CPT

## 2020-11-20 PROCEDURE — 96375 TX/PRO/DX INJ NEW DRUG ADDON: CPT

## 2020-11-20 NOTE — NUR
BIB [facility]. PT FELT RIGHT HIP POP YESTERDAY, UNABLE TO 
AMBULATE PROPERLY. PT TRANSFERRED FROM [facility] TO [facility] [place] [facility] 
ASSISTANCE. PTA [facility]: PIV 20G, 100 FENT, 4 ZOFRAN.

PT C/O LOSS OF TASTE AND SMELL FOR A FEW DAYS. PT CONNECTED TO MONITORING. CALL 
LIGHT IN REACH.

## 2021-01-29 ENCOUNTER — HOSPITAL ENCOUNTER (EMERGENCY)
Dept: HOSPITAL 8 - ED | Age: 41
Discharge: HOME | End: 2021-01-29
Payer: MEDICARE

## 2021-01-29 ENCOUNTER — HOSPITAL ENCOUNTER (EMERGENCY)
Dept: HOSPITAL 8 - ED | Age: 41
End: 2021-01-29
Payer: MEDICARE

## 2021-01-29 VITALS — DIASTOLIC BLOOD PRESSURE: 86 MMHG | SYSTOLIC BLOOD PRESSURE: 124 MMHG

## 2021-01-29 VITALS — HEIGHT: 71 IN | WEIGHT: 210.1 LBS | BODY MASS INDEX: 29.41 KG/M2

## 2021-01-29 VITALS — DIASTOLIC BLOOD PRESSURE: 77 MMHG | SYSTOLIC BLOOD PRESSURE: 135 MMHG

## 2021-01-29 VITALS — BODY MASS INDEX: 30.86 KG/M2 | HEIGHT: 71 IN | WEIGHT: 220.46 LBS

## 2021-01-29 DIAGNOSIS — R11.0: ICD-10-CM

## 2021-01-29 DIAGNOSIS — K29.00: Primary | ICD-10-CM

## 2021-01-29 DIAGNOSIS — R00.0: ICD-10-CM

## 2021-01-29 DIAGNOSIS — R10.84: ICD-10-CM

## 2021-01-29 DIAGNOSIS — F17.290: ICD-10-CM

## 2021-01-29 DIAGNOSIS — R10.13: ICD-10-CM

## 2021-01-29 LAB
ALBUMIN SERPL-MCNC: 3.7 G/DL (ref 3.4–5)
ALP SERPL-CCNC: 81 U/L (ref 45–117)
ALT SERPL-CCNC: 33 U/L (ref 12–78)
ANION GAP SERPL CALC-SCNC: 8 MMOL/L (ref 5–15)
BASOPHILS # BLD AUTO: 0 X10^3/UL (ref 0–0.1)
BASOPHILS NFR BLD AUTO: 1 % (ref 0–1)
BILIRUB SERPL-MCNC: 0.9 MG/DL (ref 0.2–1)
CALCIUM SERPL-MCNC: 9.6 MG/DL (ref 8.5–10.1)
CHLORIDE SERPL-SCNC: 103 MMOL/L (ref 98–107)
CREAT SERPL-MCNC: 1.16 MG/DL (ref 0.7–1.3)
EOSINOPHIL # BLD AUTO: 0.1 X10^3/UL (ref 0–0.4)
EOSINOPHIL NFR BLD AUTO: 1 % (ref 1–7)
ERYTHROCYTE [DISTWIDTH] IN BLOOD BY AUTOMATED COUNT: 13.2 % (ref 9.4–14.8)
LYMPHOCYTES # BLD AUTO: 1.4 X10^3/UL (ref 1–3.4)
LYMPHOCYTES NFR BLD AUTO: 28 % (ref 22–44)
MCH RBC QN AUTO: 34.3 PG (ref 27.5–34.5)
MCHC RBC AUTO-ENTMCNC: 34.5 G/DL (ref 33.2–36.2)
MD: NO
MONOCYTES # BLD AUTO: 0.6 X10^3/UL (ref 0.2–0.8)
MONOCYTES NFR BLD AUTO: 12 % (ref 2–9)
NEUTROPHILS # BLD AUTO: 3.1 X10^3/UL (ref 1.8–6.8)
NEUTROPHILS NFR BLD AUTO: 59 % (ref 42–75)
PLATELET # BLD AUTO: 339 X10^3/UL (ref 130–400)
PMV BLD AUTO: 6.8 FL (ref 7.4–10.4)
PROT SERPL-MCNC: 7.9 G/DL (ref 6.4–8.2)
RBC # BLD AUTO: 4.39 X10^6/UL (ref 4.38–5.82)
TROPONIN I SERPL-MCNC: < 0.015 NG/ML (ref 0–0.04)

## 2021-01-29 PROCEDURE — 80053 COMPREHEN METABOLIC PANEL: CPT

## 2021-01-29 PROCEDURE — 36415 COLL VENOUS BLD VENIPUNCTURE: CPT

## 2021-01-29 PROCEDURE — 99406 BEHAV CHNG SMOKING 3-10 MIN: CPT

## 2021-01-29 PROCEDURE — 85025 COMPLETE CBC W/AUTO DIFF WBC: CPT

## 2021-01-29 PROCEDURE — 99283 EMERGENCY DEPT VISIT LOW MDM: CPT

## 2021-01-29 PROCEDURE — 74022 RADEX COMPL AQT ABD SERIES: CPT

## 2021-01-29 PROCEDURE — 83690 ASSAY OF LIPASE: CPT

## 2021-01-29 PROCEDURE — 99285 EMERGENCY DEPT VISIT HI MDM: CPT

## 2021-01-29 PROCEDURE — 84484 ASSAY OF TROPONIN QUANT: CPT

## 2021-01-29 PROCEDURE — 96372 THER/PROPH/DIAG INJ SC/IM: CPT

## 2021-01-29 PROCEDURE — 93005 ELECTROCARDIOGRAM TRACING: CPT

## 2021-01-29 NOTE — NUR
Pt given maalox refusing to take it says wants something stronger, rx and 
instruct given pt wants to speak with ERP now. VSS.

## 2021-01-29 NOTE — NUR
Pt informed will not be getting anymore pain medication, rx given instruct and 
f/u. Pt becoming aggitated, yelling at the nurse that he wants pain 
medications. Informed pt to get dressed or security will be on way.

## 2021-04-26 ENCOUNTER — HOSPITAL ENCOUNTER (EMERGENCY)
Dept: HOSPITAL 8 - ED | Age: 41
Discharge: HOME | End: 2021-04-26
Payer: MEDICARE

## 2021-04-26 VITALS — SYSTOLIC BLOOD PRESSURE: 117 MMHG | DIASTOLIC BLOOD PRESSURE: 78 MMHG

## 2021-04-26 VITALS — HEIGHT: 72 IN | WEIGHT: 187.39 LBS | BODY MASS INDEX: 25.38 KG/M2

## 2021-04-26 DIAGNOSIS — R41.82: Primary | ICD-10-CM

## 2021-04-26 PROCEDURE — 99283 EMERGENCY DEPT VISIT LOW MDM: CPT

## 2021-04-26 NOTE — NUR
pt now refusing dc after multiple attempts to leave, repeatedly asking for taxi 
voucher. Refuses to answer any questions. Security called.

## 2021-04-26 NOTE — NUR
PT RESTING IN BED WITH EYES CLOSED. NAD NOTED AT THIS TIME. PT REFUSES TO 
ANSWER QUESTIONS SUCH AS HIS NAME AND . WHEN RN ASKED WHY PT WAS HERE, HE 
SHRUGS. WHEN RN ASKS IF PT HAS PAIN HE DENIES. WHEN RN ASKS IF PT WOULD LIKE TO 
LEAVE, HE KNODS YES. AWAITING ERMD EVALUATION. VSS AT THIS TIME. SIDE RAILS UP, 
CALL LIGHT IN REACH.

## 2021-04-26 NOTE — NUR
TASK RN: BIB BY [facility] FROM [facility] FOR WELL CARE CHECK. FOUND SLEEPING IN 
[facility]. WOULD NOT ANSWER QUESTIONS-[facility] UNABLE TO PERFORM EXAM AS PATIENT NOT 
RESPONDING TO MOST QUESTIONS.  

ON ARRIVAL ANSWER YES/NO QUESTIONS WITH A HEAD NOD APPROPRIATELY 

AGREEABLE TO VITALS-UNREMARKABLE 

REFUSING FSBS

## 2021-08-01 ENCOUNTER — HOSPITAL ENCOUNTER (EMERGENCY)
Dept: HOSPITAL 8 - ED | Age: 41
Discharge: LEFT BEFORE DISCHARGE | End: 2021-08-01
Payer: MEDICARE

## 2021-08-01 DIAGNOSIS — Z53.21: Primary | ICD-10-CM

## 2021-08-02 ENCOUNTER — HOSPITAL ENCOUNTER (EMERGENCY)
Facility: MEDICAL CENTER | Age: 41
End: 2021-08-02
Attending: EMERGENCY MEDICINE

## 2021-08-02 VITALS
RESPIRATION RATE: 16 BRPM | DIASTOLIC BLOOD PRESSURE: 67 MMHG | OXYGEN SATURATION: 96 % | SYSTOLIC BLOOD PRESSURE: 100 MMHG | WEIGHT: 186.29 LBS | TEMPERATURE: 98.1 F | BODY MASS INDEX: 26.67 KG/M2 | HEART RATE: 97 BPM | HEIGHT: 70 IN

## 2021-08-02 DIAGNOSIS — R51.9 ACUTE NONINTRACTABLE HEADACHE, UNSPECIFIED HEADACHE TYPE: ICD-10-CM

## 2021-08-02 DIAGNOSIS — H53.9 VISUAL CHANGES: ICD-10-CM

## 2021-08-02 DIAGNOSIS — R00.0 TACHYCARDIA: ICD-10-CM

## 2021-08-02 LAB
ALBUMIN SERPL BCP-MCNC: 4.5 G/DL (ref 3.2–4.9)
ALBUMIN/GLOB SERPL: 1.3 G/DL
ALP SERPL-CCNC: 77 U/L (ref 30–99)
ALT SERPL-CCNC: 18 U/L (ref 2–50)
ANION GAP SERPL CALC-SCNC: 14 MMOL/L (ref 7–16)
AST SERPL-CCNC: 25 U/L (ref 12–45)
BASOPHILS # BLD AUTO: 0.5 % (ref 0–1.8)
BASOPHILS # BLD: 0.03 K/UL (ref 0–0.12)
BILIRUB SERPL-MCNC: 1.2 MG/DL (ref 0.1–1.5)
BUN SERPL-MCNC: 12 MG/DL (ref 8–22)
CALCIUM SERPL-MCNC: 10.4 MG/DL (ref 8.5–10.5)
CHLORIDE SERPL-SCNC: 103 MMOL/L (ref 96–112)
CO2 SERPL-SCNC: 23 MMOL/L (ref 20–33)
CREAT SERPL-MCNC: 1.33 MG/DL (ref 0.5–1.4)
EKG IMPRESSION: NORMAL
EOSINOPHIL # BLD AUTO: 0.13 K/UL (ref 0–0.51)
EOSINOPHIL NFR BLD: 2 % (ref 0–6.9)
ERYTHROCYTE [DISTWIDTH] IN BLOOD BY AUTOMATED COUNT: 47.5 FL (ref 35.9–50)
GLOBULIN SER CALC-MCNC: 3.4 G/DL (ref 1.9–3.5)
GLUCOSE SERPL-MCNC: 156 MG/DL (ref 65–99)
HCT VFR BLD AUTO: 47.3 % (ref 42–52)
HGB BLD-MCNC: 15.9 G/DL (ref 14–18)
IMM GRANULOCYTES # BLD AUTO: 0.03 K/UL (ref 0–0.11)
IMM GRANULOCYTES NFR BLD AUTO: 0.5 % (ref 0–0.9)
LIPASE SERPL-CCNC: 32 U/L (ref 11–82)
LYMPHOCYTES # BLD AUTO: 1.9 K/UL (ref 1–4.8)
LYMPHOCYTES NFR BLD: 29.6 % (ref 22–41)
MCH RBC QN AUTO: 33.9 PG (ref 27–33)
MCHC RBC AUTO-ENTMCNC: 33.6 G/DL (ref 33.7–35.3)
MCV RBC AUTO: 100.9 FL (ref 81.4–97.8)
MONOCYTES # BLD AUTO: 0.6 K/UL (ref 0–0.85)
MONOCYTES NFR BLD AUTO: 9.3 % (ref 0–13.4)
NEUTROPHILS # BLD AUTO: 3.73 K/UL (ref 1.82–7.42)
NEUTROPHILS NFR BLD: 58.1 % (ref 44–72)
NRBC # BLD AUTO: 0 K/UL
NRBC BLD-RTO: 0 /100 WBC
PLATELET # BLD AUTO: 294 K/UL (ref 164–446)
PMV BLD AUTO: 8.6 FL (ref 9–12.9)
POTASSIUM SERPL-SCNC: 4.4 MMOL/L (ref 3.6–5.5)
PROT SERPL-MCNC: 7.9 G/DL (ref 6–8.2)
RBC # BLD AUTO: 4.69 M/UL (ref 4.7–6.1)
SODIUM SERPL-SCNC: 140 MMOL/L (ref 135–145)
WBC # BLD AUTO: 6.4 K/UL (ref 4.8–10.8)

## 2021-08-02 PROCEDURE — 85025 COMPLETE CBC W/AUTO DIFF WBC: CPT

## 2021-08-02 PROCEDURE — 93005 ELECTROCARDIOGRAM TRACING: CPT

## 2021-08-02 PROCEDURE — 99284 EMERGENCY DEPT VISIT MOD MDM: CPT

## 2021-08-02 PROCEDURE — 80053 COMPREHEN METABOLIC PANEL: CPT

## 2021-08-02 PROCEDURE — 93005 ELECTROCARDIOGRAM TRACING: CPT | Performed by: EMERGENCY MEDICINE

## 2021-08-02 PROCEDURE — 83690 ASSAY OF LIPASE: CPT

## 2021-08-02 RX ORDER — SODIUM CHLORIDE, SODIUM LACTATE, POTASSIUM CHLORIDE, AND CALCIUM CHLORIDE .6; .31; .03; .02 G/100ML; G/100ML; G/100ML; G/100ML
1000 INJECTION, SOLUTION INTRAVENOUS ONCE
Status: DISCONTINUED | OUTPATIENT
Start: 2021-08-02 | End: 2021-08-02 | Stop reason: HOSPADM

## 2021-08-02 RX ORDER — ONDANSETRON 2 MG/ML
4 INJECTION INTRAMUSCULAR; INTRAVENOUS ONCE
Status: DISCONTINUED | OUTPATIENT
Start: 2021-08-02 | End: 2021-08-02 | Stop reason: HOSPADM

## 2021-08-02 NOTE — ED PROVIDER NOTES
"ED Provider Note    Scribed for Mikey Pacheco M.D. by Mary Mathias. 8/2/2021  3:32 PM    Primary care provider: No primary care provider on file.  Means of arrival: walk-in  History obtained from: patient  History limited by: none    CHIEF COMPLAINT  Chief Complaint   Patient presents with    Vision Change     pt reports \"foggy, dark and blurry vison\" x's 2 days. pt     Abdominal Pain     x's 3 days        HPI  Miles Brush Sr. is a 40 y.o. male who presents to the Emergency Department for evaluation of change of vision onset two weeks. The patient states that his vision is blurry and when he did the vision test he states that he could not see it very well. He admits to associated symptoms of headaches and hallucinations but denies nausea, chest pain, shortness of breath. The patient states that he keeps seeing a face appear in front of him. No alleviating or exacerbating factors were reported. The patient denies drug use.       REVIEW OF SYSTEMS  Pertinent positives include vision changes, hallucinations, and headache.   Pertinent negatives include no nausea, chest pain, shortness of breath.    All other systems reviewed and negative.    PAST MEDICAL HISTORY       SURGICAL HISTORY  patient denies any surgical history    SOCIAL HISTORY  Social History     Tobacco Use    Smoking status: Former Smoker   Substance Use Topics    Alcohol use: Yes    Drug use: Not Currently      Social History     Substance and Sexual Activity   Drug Use Not Currently       FAMILY HISTORY  History reviewed. No pertinent family history.    CURRENT MEDICATIONS  Home Medications       Reviewed by Noa Mabry R.N. (Registered Nurse) on 08/02/21 at 1319  Med List Status: Not Addressed     Medication Last Dose Status        Patient Carlos Taking any Medications                           ALLERGIES  No Known Allergies    PHYSICAL EXAM  VITAL SIGNS: /68   Pulse 94   Temp 36.7 °C (98.1 °F) (Temporal)   Resp 16   Ht 1.778 " "m (5' 10\")   Wt 84.5 kg (186 lb 4.6 oz)   SpO2 94%   BMI 26.73 kg/m²     Constitutional: Well developed, Well nourished, No distress, Non-toxic appearance.   HENT: Normocephalic, Atraumatic, Bilateral external ears normal, Oropharynx moist, No oral exudates.   Eyes: PERRLA, EOMI, Conjunctiva normal, No discharge.   Neck: No tenderness, Supple, No stridor.   Lymphatic: No lymphadenopathy noted.   Cardiovascular: Tachycardiac, Normal rhythm.   Thorax & Lungs: Clear to auscultation bilaterally, No respiratory distress, No wheezing, No crackles.   Abdomen: Soft, No tenderness, No masses, No pulsatile masses.   Skin: Warm, Dry, No erythema, No rash.   Extremities:, No edema No cyanosis.   Musculoskeletal: No tenderness to palpation or major deformities noted.  Intact distal pulses. 5/5 strength throughout.  Neurologic: Awake, alert. Moves all extremities spontaneously. Intermittent facial twitching and erratic eye movements.  Psychiatric: Affect normal, Judgment normal, Mood normal. Odd behavior, states positive for visual hallucinations, does not appear to be actively psychotic.      COURSE & MEDICAL DECISION MAKING  Pertinent Labs & Imaging studies reviewed. (See chart for details)    3:32 PM - Patient seen and examined at bedside. At this time the patient states that he wants to leave and doesn't want treatment.  The differential diagnoses include but are not limited to: intracranial pathology vs psychiatric disorder vs drug use          Decision Making:  Patient is coming in with blurred vision of uncertain etiology originally said he had abdominal pain and nausea.  The patient has multiple facial tics, denies any drug abuse, I am concerned about methamphetamine abuse.  I wanted to do a CT scan, basic laboratory test, the patient refuses, he wants to leave AGAINST MEDICAL ADVICE, he is awake alert and able to make competent medical decisions.  I discussed with him he could return at any time.  The patient signed " the AMA form    The patient is leaving against medical advice.  I discussed with the patient the risks of leaving without receiving appropriate care to include permanent disability or death.  I have discussed possible alternatives.  The patient is not intoxicated.  The patient is a capable decision maker and understands the risks and benefits of their decision.  While I certainly disagree with the patient's decision, I respect the patient's autonomy and will not keep them here against their will.  They understand that their decision to leave can be reversed at any time and they can return to us at any time for any reason at all.    FINAL IMPRESSION  1. Visual changes    2. Tachycardia    3. Acute nonintractable headache, unspecified headache type          I, Mary Mathias (Scribe), am scribing for, and in the presence of, Mikey Pacheco M.D..    Electronically signed by: Mary Mathias (Chrisibdemetrice), 8/2/2021    IMikey M.D. personally performed the services described in this documentation, as scribed by Mary Mathias in my presence, and it is both accurate and complete.    The note accurately reflects work and decisions made by me.  Mikey Pacheco M.D.  8/2/2021  10:12 PM

## 2021-08-02 NOTE — ED NOTES
Pt reports his abdominal pain is resolved. He thinks that discomfort is related to stress.   Vision changes x1 weeks. Reports vision is dark like the lights are dim. He denies any similar issues.

## 2021-08-02 NOTE — ED NOTES
Charge nurse called due to pt with blurry vision, low pressure at 86/64 then 93/63. Charge nurse instructed an EKG. PT went back with Tech for EKG

## 2021-08-02 NOTE — ED NOTES
Pt has been provided instructions for visual changes. He verbalized understanding that he is leaving against the advise of the physician. He has been provided follow up recommendations. Ambulatory on DC

## 2021-08-02 NOTE — ED TRIAGE NOTES
"..  Chief Complaint   Patient presents with   • Vision Change     pt reports \"foggy, dark and blurry vison\" x's 2 days. pt    • Abdominal Pain     x's 3 days      Reports diarrhea, nausea     Pt is a poor historian.       .BP (!) 86/64   Pulse (!) 137   Temp 36.7 °C (98.1 °F) (Temporal)   Resp 16   Ht 1.778 m (5' 10\")   Wt 84.5 kg (186 lb 4.6 oz)   SpO2 95%          Explained triage process, to waiting room. Asked to inform RN if questions or concerns arise.   "